# Patient Record
Sex: MALE | Race: WHITE | NOT HISPANIC OR LATINO | Employment: UNEMPLOYED | URBAN - METROPOLITAN AREA
[De-identification: names, ages, dates, MRNs, and addresses within clinical notes are randomized per-mention and may not be internally consistent; named-entity substitution may affect disease eponyms.]

---

## 2023-01-21 ENCOUNTER — HOSPITAL ENCOUNTER (EMERGENCY)
Facility: CLINIC | Age: 28
Discharge: HOME | End: 2023-01-21
Attending: EMERGENCY MEDICINE | Admitting: EMERGENCY MEDICINE
Payer: MEDICAID

## 2023-01-21 VITALS
WEIGHT: 205 LBS | DIASTOLIC BLOOD PRESSURE: 83 MMHG | SYSTOLIC BLOOD PRESSURE: 129 MMHG | BODY MASS INDEX: 25.49 KG/M2 | HEART RATE: 76 BPM | OXYGEN SATURATION: 100 % | RESPIRATION RATE: 18 BRPM | HEIGHT: 75 IN | TEMPERATURE: 98.2 F

## 2023-01-21 DIAGNOSIS — Z78.9 ALCOHOL USE: ICD-10-CM

## 2023-01-21 DIAGNOSIS — F41.9 ANXIETY: ICD-10-CM

## 2023-01-21 PROCEDURE — 99283 EMERGENCY DEPT VISIT LOW MDM: CPT

## 2023-01-21 PROCEDURE — 250N000013 HC RX MED GY IP 250 OP 250 PS 637: Performed by: EMERGENCY MEDICINE

## 2023-01-21 RX ORDER — LORAZEPAM 1 MG/1
1 TABLET ORAL ONCE
Status: COMPLETED | OUTPATIENT
Start: 2023-01-21 | End: 2023-01-21

## 2023-01-21 RX ADMIN — LORAZEPAM 1 MG: 1 TABLET ORAL at 12:23

## 2023-01-21 ASSESSMENT — ACTIVITIES OF DAILY LIVING (ADL): ADLS_ACUITY_SCORE: 35

## 2023-01-21 ASSESSMENT — ENCOUNTER SYMPTOMS
COUGH: 0
FEVER: 0
VOMITING: 0

## 2023-01-21 NOTE — ED PROVIDER NOTES
"  History     Chief Complaint:  Tingling    The history is provided by the patient.      Al Cr is a 27 year old male who presents with tingling. The patient reports onset of full body tingling and cramping in his fingers approximately 20 minutes prior to arrival in the emergency department. States that he had just left a friends house after drinking a significant amount of alcohol last night. Notes that he drinks alcohol 3 times per week. Adds that he has an adderall prescription, but that he has not taken it in a while. Denies use of recreational drugs. Denies vomiting, pain, fever, and cough.  no lateralizing symptoms.  No focal weakness.    Independent Historian: His friend at bedside corroborates the history that he provides, notes that the patient was experiencing some \"panic\".    Review of External Notes: I personally performed electronic chart review, noting no recent visits for similar symptoms.    ROS:  Review of Systems   Constitutional: Negative for fever.   Respiratory: Negative for cough.    Gastrointestinal: Negative for vomiting.   Neurological:        Tingling   All other systems reviewed and are negative.    Allergies:  [place] Pollen Allergy Skin Test  Peanut (Diagnostic)  Sulfa Drugs     Medications:    Adderall   Naproxen     Past Medical History:    ADHD  Social anxiety disorder  Wrist fracture, right    Social History:  Presents with friend  Presents via private vehicle     Physical Exam     Patient Vitals for the past 24 hrs:   BP Temp Temp src Pulse Resp SpO2 Height Weight   01/21/23 1255 129/83 -- -- 76 18 100 % -- --   01/21/23 1219 (!) 162/118 98.2  F (36.8  C) Oral 83 20 100 % 1.905 m (6' 3\") 93 kg (205 lb)      Physical Exam  General: Nontoxic-appearing male sitting upright in room 5, female  at bedside  HENT: mucous membranes moist, OP clear  CV: rate as above, regular rhythm, no murmur audible, normal bilateral radial pulses, no lower extremity edema   Resp: " normal effort, speaks in full phrases, no stridor, no cough observed  GI: abdomen soft and nontender, no guarding  MSK: no bony tenderness, no CVAT  Skin: appropriately warm and dry  Neuro: awake, alert, clear speech, fully oriented, face symmetric,  normal, strength and sensation intact in all extr, no nuchal rigidity, ambulation steady  Psych: Anxious, cooperative, not suicidal      Emergency Department Course   Emergency Department Course & Assessments:     Interventions:  Medications   LORazepam (ATIVAN) tablet 1 mg (1 mg Oral Given 1/21/23 1223)      Consultations/Discussion of Management or Tests:  ED Course as of 01/21/23 1256   Sat Jan 21, 2023   1214 I obtained initial history and examined the patient as noted above.    1256 I rechecked and updated the patient.      Disposition:  The patient was discharged to home.     Impression & Plan    Medical Decision Making:  Presentation compatible with anxiety.  We discussed the possibility of performing basic lab tests and IV fluids though did not feel this was absolutely indicated, patient wished to avoid this.  Likelihood of hypoglycemia, major metabolic abnormality, or other serious disease process became even lower when he recovered uneventfully from his symptoms with administration of lorazepam and monitoring with a brief period of observation in the emergency department.  Vital signs normalized.  No signs of decompensated psychiatric illness to the extent that he would require mandatory DEC evaluation, and he did not wish to have further psychiatric evaluation but instead opted for subsequent discharge which I felt was reasonable.  He would benefit from moderation of his alcohol use.  No focal neurologic deficits at this time, highly doubt intracranial process such as stroke or tumor.  He is welcome back for crisis at any hour.  No signs of alcohol intoxication or withdrawal at this time, no evidence of other toxidrome either.    Diagnosis:    ICD-10-CM     1. Anxiety  F41.9       2. Alcohol use  Z78.9             Scribe Disclosure:  I, Elmira Pawelbentley, am serving as a scribe at 12:35 PM on 1/21/2023 to document services personally performed by Wilfredo Bernal MD based on my observations and the provider's statements to me.    1/21/2023   Wilfredo Bernal MD Reitsema, Jeffrey Alan, MD  01/21/23 2012

## 2023-01-21 NOTE — ED TRIAGE NOTES
"Pt admits to drinking last night states he had \"quite a few beers, and a few shots\". Denies nausea, no emesis. No diarrhea, no drugs.      Triage Assessment     Row Name 01/21/23 1220       Triage Assessment (Adult)    Airway WDL WDL       Respiratory WDL    Respiratory WDL WDL       Skin Circulation/Temperature WDL    Skin Circulation/Temperature WDL WDL       Cardiac WDL    Cardiac WDL WDL       Peripheral/Neurovascular WDL    Peripheral Neurovascular WDL --  tingling in fingers, started in lips, that is resolved.       Cognitive/Neuro/Behavioral WDL    Cognitive/Neuro/Behavioral WDL WDL              "